# Patient Record
Sex: MALE | Race: WHITE | NOT HISPANIC OR LATINO | Employment: FULL TIME | URBAN - METROPOLITAN AREA
[De-identification: names, ages, dates, MRNs, and addresses within clinical notes are randomized per-mention and may not be internally consistent; named-entity substitution may affect disease eponyms.]

---

## 2018-06-13 ENCOUNTER — OFFICE VISIT (OUTPATIENT)
Dept: URGENT CARE | Facility: URGENT CARE | Age: 23
End: 2018-06-13
Payer: COMMERCIAL

## 2018-06-13 VITALS
DIASTOLIC BLOOD PRESSURE: 60 MMHG | WEIGHT: 159 LBS | TEMPERATURE: 98.8 F | SYSTOLIC BLOOD PRESSURE: 142 MMHG | HEART RATE: 88 BPM | BODY MASS INDEX: 21.54 KG/M2 | HEIGHT: 72 IN

## 2018-06-13 DIAGNOSIS — L02.32 FURUNCLE OF BUTTOCK: Primary | ICD-10-CM

## 2018-06-13 PROCEDURE — 99213 OFFICE O/P EST LOW 20 MIN: CPT | Performed by: INTERNAL MEDICINE

## 2018-06-13 RX ORDER — SULFAMETHOXAZOLE/TRIMETHOPRIM 800-160 MG
1 TABLET ORAL 2 TIMES DAILY
Qty: 20 TABLET | Refills: 0 | Status: SHIPPED | OUTPATIENT
Start: 2018-06-13 | End: 2019-12-11

## 2018-06-13 NOTE — NURSING NOTE
Ed Polanco;   Chief Complaint   Patient presents with     Rectal Problem     lump in rectal area onset yesterday, painful, growing in size      Urgent Care     Initial /60 (BP Location: Right arm, Patient Position: Chair, Cuff Size: Adult Regular)  Pulse 88  Temp 98.8  F (37.1  C) (Oral)  Ht 6' (1.829 m)  Wt 159 lb (72.1 kg)  BMI 21.56 kg/m2 Estimated body mass index is 21.56 kg/(m^2) as calculated from the following:    Height as of this encounter: 6' (1.829 m).    Weight as of this encounter: 159 lb (72.1 kg)..  BP completed using cuff size regular.  Kerrie Velez R.N.

## 2018-06-13 NOTE — PROGRESS NOTES
SUBJECTIVE:   Ed Polanco is a 22 year old male presenting with a chief complaint of   Chief Complaint   Patient presents with     Rectal Problem     lump in rectal area onset yesterday, painful, growing in size      Urgent Care       He is an established patient of [facility].      Size of blueberry yesterday and larger today  Area is deep under skin  No redness  Painful to sit.  firm    No treatment tried    No fever    First episode    Review of Systems    Past Medical History:   Diagnosis Date     Allergic rhinitis, cause unspecified     Allergic rhinitis     Asthma     childhood.  outgrew     Contact dermatitis and other eczema, due to unspecified cause      Family History   Problem Relation Age of Onset     Other Cancer Paternal Grandmother      brain     Other Cancer Paternal Grandfather      pancreatic     Current Outpatient Prescriptions   Medication Sig Dispense Refill     albuterol (ALBUTEROL) 108 (90 BASE) MCG/ACT inhaler 1-2 puffs q4-6 hours prn shortness of breath/ also 1/2 hour prior to exercise. 1 each 0     minocycline (MINOCIN,DYNACIN) 50 MG capsule Take 1 capsule (50 mg) by mouth 2 times daily (Patient not taking: Reported on 6/13/2018) 60 capsule 1     sulfamethoxazole-trimethoprim (BACTRIM DS/SEPTRA DS) 800-160 MG per tablet Take 1 tablet by mouth 2 times daily 20 tablet 0     Social History   Substance Use Topics     Smoking status: Former Smoker     Smokeless tobacco: Never Used      Comment: dad smokes outside not in the home     Alcohol use No       OBJECTIVE  /60 (BP Location: Right arm, Patient Position: Chair, Cuff Size: Adult Regular)  Pulse 88  Temp 98.8  F (37.1  C) (Oral)  Ht 6' (1.829 m)  Wt 159 lb (72.1 kg)  BMI 21.56 kg/m2    Physical Exam   Constitutional: He appears well-developed and well-nourished.   Skin:   Perineum left side  3 x 1 cm firm area.   Non-fluent.  Skin colored.  No redness or warmth at this time       Labs:  No results found for this or any  previous visit (from the past 24 hour(s)).        ASSESSMENT:      ICD-10-CM    1. Furuncle of buttock L02.32 sulfamethoxazole-trimethoprim (BACTRIM DS/SEPTRA DS) 800-160 MG per tablet        Medical Decision Making:    PLAN:      Patient Instructions     Bactrim 2 x day for 7-10 days.  Yogurt/probiotics  May alternate between sitz and cool compress.  If worsens, larger, develops head, - may need incision and drainage.  If drains on own, may clear up on own    Monitor size & fever    Call or return to clinic if symptoms worsen or fail to improve as anticipated.        Understanding Carbuncles    A carbuncle is a painful boil under the skin. It happens when a group of hair follicles become infected. Follicles are the tiny holes from which hair grows out of your skin.  How to say it  Pedro   What causes carbuncles?  A carbuncle is caused by an infection with the bacteria Staphylococcus aureus. They are common on areas of the body where friction and sweat occur. They usually appear on the back of the neck, back, and thighs. This type of infection can also happen when the skin is injured, such as by a cut or bug bite.  The bacteria that causes carbuncles can spread easily from person to person. People at higher risk for boils are those with diabetes or a weak immune system. Drug users who use needles are also more likely to get them.  Symptoms of carbuncles  A carbuncle starts as a small painful bump. But it can grow quickly. It may become:    Red    Swollen    Tender  Carbuncles may ooze pus. They may also cause fever and a general feeling of illness.  Treatment for carbuncles  A carbuncle may heal on its own in a few weeks. But the pus within it needs to come out first. Treatment options include:    Warm compress. Putting a warm, wet washcloth on the boil will help the pus drain out. You should never try to pop a boil. That can cause the infection to spread.    Surgical drainage. If the boil doesn t drain on  its own, your healthcare provider may need to cut into it.    Antibiotics. In some cases, oral antibiotics may be prescribed to fight the infection, especially if the carbuncle returns. You will likely have to take the medicine for 5 to 7 days. You may need to take it longer for a severe case.    Good hygiene. Proper handwashing can prevent boils from spreading and coming back. Also wash things that have been in contact with the carbuncle in hot water. This includes items such as clothing and towels.  Complications of carbuncles  The main complication of a carbuncle is the spreading of the infection. The bacteria can infect the heart and bone. It can also lead to septic shock, an infection of the entire body.  When to call your healthcare provider  Call your healthcare provider right away if you have any of these:    Fever of 100.4 F (38 C) or higher, or as directed    Redness, swelling, or fluid leaking from a carbuncle that gets worse    Pain that gets worse    Symptoms that don t get better, or get worse    New symptoms   Date Last Reviewed: 5/1/2016 2000-2017 [employer]. [address] [place] [address], [place] [address]. All rights reserved. This information is not intended as a substitute for professional medical care. Always follow your healthcare professional's instructions.

## 2018-06-13 NOTE — PATIENT INSTRUCTIONS
Bactrim 2 x day for 7-10 days.  Yogurt/probiotics  May alternate between sitz and cool compress.  If worsens, larger, develops head, - may need incision and drainage.  If drains on own, may clear up on own    Monitor size & fever    Call or return to clinic if symptoms worsen or fail to improve as anticipated.        Understanding Carbuncles    A carbuncle is a painful boil under the skin. It happens when a group of hair follicles become infected. Follicles are the tiny holes from which hair grows out of your skin.  How to say it  Pedro   What causes carbuncles?  A carbuncle is caused by an infection with the bacteria Staphylococcus aureus. They are common on areas of the body where friction and sweat occur. They usually appear on the back of the neck, back, and thighs. This type of infection can also happen when the skin is injured, such as by a cut or bug bite.  The bacteria that causes carbuncles can spread easily from person to person. People at higher risk for boils are those with diabetes or a weak immune system. Drug users who use needles are also more likely to get them.  Symptoms of carbuncles  A carbuncle starts as a small painful bump. But it can grow quickly. It may become:    Red    Swollen    Tender  Carbuncles may ooze pus. They may also cause fever and a general feeling of illness.  Treatment for carbuncles  A carbuncle may heal on its own in a few weeks. But the pus within it needs to come out first. Treatment options include:    Warm compress. Putting a warm, wet washcloth on the boil will help the pus drain out. You should never try to pop a boil. That can cause the infection to spread.    Surgical drainage. If the boil doesn t drain on its own, your healthcare provider may need to cut into it.    Antibiotics. In some cases, oral antibiotics may be prescribed to fight the infection, especially if the carbuncle returns. You will likely have to take the medicine for 5 to 7 days. You may need  to take it longer for a severe case.    Good hygiene. Proper handwashing can prevent boils from spreading and coming back. Also wash things that have been in contact with the carbuncle in hot water. This includes items such as clothing and towels.  Complications of carbuncles  The main complication of a carbuncle is the spreading of the infection. The bacteria can infect the heart and bone. It can also lead to septic shock, an infection of the entire body.  When to call your healthcare provider  Call your healthcare provider right away if you have any of these:    Fever of 100.4 F (38 C) or higher, or as directed    Redness, swelling, or fluid leaking from a carbuncle that gets worse    Pain that gets worse    Symptoms that don t get better, or get worse    New symptoms   Date Last Reviewed: 5/1/2016 2000-2017 [employer]. [address] [place] [address], [place] [address]. All rights reserved. This information is not intended as a substitute for professional medical care. Always follow your healthcare professional's instructions.

## 2018-06-13 NOTE — MR AVS SNAPSHOT
After Visit Summary   6/13/2018    Ed Polanco    MRN: [id number]           Patient Information     Date Of Birth          1995        Visit Information        Provider Department      6/13/2018 5:00 PM Zaida Ronquillo MD [facility]        Today's Diagnoses     Furuncle of buttock    -  1      Care Instructions    Bactrim 2 x day for 7-10 days.  Yogurt/probiotics  May alternate between sitz and cool compress.  If worsens, larger, develops head, - may need incision and drainage.  If drains on own, may clear up on own    Monitor size & fever    Call or return to clinic if symptoms worsen or fail to improve as anticipated.        Understanding Carbuncles    A carbuncle is a painful boil under the skin. It happens when a group of hair follicles become infected. Follicles are the tiny holes from which hair grows out of your skin.  How to say it  Pedro   What causes carbuncles?  A carbuncle is caused by an infection with the bacteria Staphylococcus aureus. They are common on areas of the body where friction and sweat occur. They usually appear on the back of the neck, back, and thighs. This type of infection can also happen when the skin is injured, such as by a cut or bug bite.  The bacteria that causes carbuncles can spread easily from person to person. People at higher risk for boils are those with diabetes or a weak immune system. Drug users who use needles are also more likely to get them.  Symptoms of carbuncles  A carbuncle starts as a small painful bump. But it can grow quickly. It may become:    Red    Swollen    Tender  Carbuncles may ooze pus. They may also cause fever and a general feeling of illness.  Treatment for carbuncles  A carbuncle may heal on its own in a few weeks. But the pus within it needs to come out first. Treatment options include:    Warm compress. Putting a warm, wet washcloth on the boil will help the pus drain out. You should  never try to pop a boil. That can cause the infection to spread.    Surgical drainage. If the boil doesn t drain on its own, your healthcare provider may need to cut into it.    Antibiotics. In some cases, oral antibiotics may be prescribed to fight the infection, especially if the carbuncle returns. You will likely have to take the medicine for 5 to 7 days. You may need to take it longer for a severe case.    Good hygiene. Proper handwashing can prevent boils from spreading and coming back. Also wash things that have been in contact with the carbuncle in hot water. This includes items such as clothing and towels.  Complications of carbuncles  The main complication of a carbuncle is the spreading of the infection. The bacteria can infect the heart and bone. It can also lead to septic shock, an infection of the entire body.  When to call your healthcare provider  Call your healthcare provider right away if you have any of these:    Fever of 100.4 F (38 C) or higher, or as directed    Redness, swelling, or fluid leaking from a carbuncle that gets worse    Pain that gets worse    Symptoms that don t get better, or get worse    New symptoms   Date Last Reviewed: 5/1/2016 2000-2017 [employer]. [address] [place] [address]. All rights reserved. This information is not intended as a substitute for professional medical care. Always follow your healthcare professional's instructions.                Follow-ups after your visit        Who to contact     If you have questions or need follow up information about today's clinic visit or your schedule please contact [facility] directly at [phone].  Normal or non-critical lab and imaging results will be communicated to you by MyChart, letter or phone within 4 business days after the clinic has received the results. If you do not hear from us within 7 days, please contact the clinic through MyChart or phone. If you have a  "critical or abnormal lab result, we will notify you by phone as soon as possible.  Submit refill requests through [employer] or call your pharmacy and they will forward the refill request to us. Please allow 3 business days for your refill to be completed.          Additional Information About Your Visit        [employer] Information     [employer] lets you send messages to your doctor, view your test results, renew your prescriptions, schedule appointments and more. To sign up, go to [email or web address] . Click on \"Log in\" on the left side of the screen, which will take you to the Welcome page. Then click on \"Sign up Now\" on the right side of the page.     You will be asked to enter the access code listed below, as well as some personal information. Please follow the directions to create your username and password.     Your access code is: [email or web address]  Expires: 2018  5:24 PM     Your access code will  in 90 days. If you need help or a new code, please call your [facility] clinic or [phone].        Care EveryWhere ID     This is your Care EveryWhere ID. This could be used by other organizations to access your [facility] medical records  [id number]        Your Vitals Were     Pulse Temperature Height BMI (Body Mass Index)          88 98.8  F (37.1  C) (Oral) 6' (1.829 m) 21.56 kg/m2         Blood Pressure from Last 3 Encounters:   18 142/60   06/02/15 112/66   12 112/70    Weight from Last 3 Encounters:   18 159 lb (72.1 kg)   06/02/15 169 lb 12.8 oz (77 kg) (71 %)*   12 150 lb 9.6 oz (68.3 kg) (65 %)*     * Growth percentiles are based on CDC 2-20 Years data.              Today, you had the following     No orders found for display         Today's Medication Changes          These changes are accurate as of 18  5:24 PM.  If you have any questions, ask your nurse or doctor.               Start taking these medicines.        Dose/Directions    sulfamethoxazole-trimethoprim " 800-160 MG per tablet   Commonly known as:  BACTRIM DS/SEPTRA DS   Used for:  Furuncle of buttock   Started by:  Zaida Ronquillo MD        Dose:  1 tablet   Take 1 tablet by mouth 2 times daily   Quantity:  20 tablet   Refills:  0            Where to get your medicines      These medications were sent to [facility] - [place], [place] - [address]  [address] [facility], [place] [address]     Phone:  [phone]     sulfamethoxazole-trimethoprim 800-160 MG per tablet                Primary Care Provider Office Phone # Fax #    Jeremiah Melgar [phone]       [address]  [facility] [address]        Equal Access to Services     KULDEEP VOSS : Hadii charmaine garcia hadasho Soomaali, waaxda luqadaha, qaybta kaalmada adeegyada, waxay emeli henry . So [facility] [phone].    ATENCIÓN: Si habla español, tiene a ruvalcaba disposición servicios gratuitos de asistencia lingüística. [facility] [phone].    We comply with applicable federal civil rights laws and [place] laws. We do not discriminate on the basis of race, color, national origin, age, disability, sex, sexual orientation, or gender identity.            Thank you!     Thank you for choosing [facility]  for your care. Our goal is always to provide you with excellent care. Hearing back from our patients is one way we can continue to improve our services. Please take a few minutes to complete the written survey that you may receive in the mail after your visit with us. Thank you!             Your Updated Medication List - Protect others around you: Learn how to safely use, store and throw away your medicines at www.disposemymeds.org.          This list is accurate as of 6/13/18  5:24 PM.  Always use your most recent med list.                   Brand Name Dispense Instructions for use Diagnosis    albuterol 108 (90 Base) MCG/ACT Inhaler    PROAIR HFA    1 each    1-2 puffs q4-6 hours prn  shortness of breath/ also 1/2 hour prior to exercise.    Acute bronchospasm       minocycline 50 MG capsule    MINOCIN/DYNACIN    60 capsule    Take 1 capsule (50 mg) by mouth 2 times daily    Cystic acne       sulfamethoxazole-trimethoprim 800-160 MG per tablet    BACTRIM DS/SEPTRA DS    20 tablet    Take 1 tablet by mouth 2 times daily    Furuncle of buttock

## 2019-12-11 ENCOUNTER — OFFICE VISIT (OUTPATIENT)
Dept: URGENT CARE | Facility: URGENT CARE | Age: 24
End: 2019-12-11
Payer: COMMERCIAL

## 2019-12-11 VITALS
RESPIRATION RATE: 14 BRPM | HEART RATE: 60 BPM | BODY MASS INDEX: 23.03 KG/M2 | SYSTOLIC BLOOD PRESSURE: 128 MMHG | HEIGHT: 72 IN | TEMPERATURE: 98.8 F | OXYGEN SATURATION: 100 % | DIASTOLIC BLOOD PRESSURE: 80 MMHG | WEIGHT: 170 LBS

## 2019-12-11 DIAGNOSIS — J06.9 VIRAL URI WITH COUGH: Primary | ICD-10-CM

## 2019-12-11 PROCEDURE — 99213 OFFICE O/P EST LOW 20 MIN: CPT | Performed by: NURSE PRACTITIONER

## 2019-12-11 ASSESSMENT — MIFFLIN-ST. JEOR: SCORE: 1799.11

## 2019-12-11 NOTE — PROGRESS NOTES
SUBJECTIVE:   Ed Polanco is a 24 year old male presenting with a chief complaint of   Chief Complaint   Patient presents with     URI     Urgent Care     bad coughing, going on since last weekend.    .    Onset of symptoms was 5 day(s) ago.  Course of illness is waxing and waning   Severity moderate  Current and Associated symptoms: sore throat  Treatment measures tried include None tried.  Predisposing factors include ill contact: Work.    CONSTITUTIONAL: no fatigue, no unexpected change in weight  SKIN: no worrisome rashes, no worrisome moles, no worrisome lesions  EYES: no acute vision problems or changes  ENT: no ear problems, no mouth problems, no throat problems  CV: no chest pain, no palpitations, no new or worsening peripheral edema  GI: no nausea, no vomiting, no constipation, no diarrhea  : no frequency, no dysuria, no hematuria  MS: no claudication, no myalgias, no joint aches  NEURO: no weakness, no dizziness, no syncope, no headaches  HEME: no easy bruising, no bleeding problems      Past Medical History:   Diagnosis Date     Allergic rhinitis, cause unspecified     Allergic rhinitis     Asthma     childhood.  outgrew     Contact dermatitis and other eczema, due to unspecified cause      Current Outpatient Medications   Medication Sig Dispense Refill     albuterol (ALBUTEROL) 108 (90 BASE) MCG/ACT inhaler 1-2 puffs q4-6 hours prn shortness of breath/ also 1/2 hour prior to exercise. 1 each 0     Social History     Tobacco Use     Smoking status: Former Smoker     Smokeless tobacco: Never Used     Tobacco comment: dad smokes outside not in the home   Substance Use Topics     Alcohol use: No       OBJECTIVE  /80   Pulse 60   Temp 98.8  F (37.1  C) (Oral)   Resp 14   Ht 1.829 m (6')   Wt 77.1 kg (170 lb)   SpO2 100%   BMI 23.06 kg/m        GENERAL APPEARANCE: healthy appearing, alert     EYES: PERRL, EOMI, sclera non-icteric     HENT: oral exam benign, mucus membranes slightly intact  but slightly dry without ulcers or lesions     NECK: no adenopathy or asymmetry, thyroid normal to palpation     RESP: lungs clear to auscultation - no rales, rhonchi or wheezes     CV: regular rates and rhythm, no murmurs, rubs, or gallop     ABDOMEN:  soft, nontender, no HSM or masses and bowel sounds normal     MS: extremities normal- no gross deformities noted; normal muscle tone.     SKIN: no suspicious lesions or rashes    X-Ray was not done.      ICD-10-CM    1. Viral URI with cough J06.9     B97.89        Followup:  Return in about 1 week (around 12/18/2019), or if symptoms worsen or fail to improve.    Patient Instructions   Upper respiratory infections are usually caused by viruses and, sometimes certain bacteria.  Antibiotics don't help the vast majority of people recover any quicker even when caused by a bacteria.  The body will fight this infection but it needs to be treated well in order to help heal itself.  Rest as needed.  It is ok to reduce food intake if appetite is poor but it is quite important to maintain/increase fluid intake.    For cough, dextromethorphan/guaifenesin combinations help loosen secretions and suppress cough safely without significant risk of sedation. Often 2 puffs four times daily of an albuterol inhaler will help with bronchitis.  This is a prescription medicine.    For nasal congestion and sinus pressure, pseudoephedrine (Sudafed) or phenylephrine is often helpful but it can cause elevations in blood pressure and insomnia.  Short courses of a nasal decongestant spray (Afrin or Neosinephrine) can be appropriate but their use should be restricted to 3 days due to the high risk of nasal addiction.    For pain and fevers, acetaminophen (Tylenol) is most appropriate.  Ibuprofen (Advil) or naproxen (Aleve) are useful too and last longer but they can cause elevation of blood pressure or stomach problems.    Antihistamines (Benadryl, Dimetapp, etc.) cause sedation, confusion, bowel  and urinary abnormalities and are of little use for infectious causes of cough and nasal congestion.  Their use should be reserved for allergic symptoms.      DAVION Arias, CNP  [facility] Provider

## 2019-12-23 ENCOUNTER — OFFICE VISIT (OUTPATIENT)
Dept: URGENT CARE | Facility: URGENT CARE | Age: 24
End: 2019-12-23
Payer: COMMERCIAL

## 2019-12-23 VITALS
DIASTOLIC BLOOD PRESSURE: 66 MMHG | HEIGHT: 72 IN | TEMPERATURE: 99.3 F | RESPIRATION RATE: 14 BRPM | SYSTOLIC BLOOD PRESSURE: 128 MMHG | WEIGHT: 170 LBS | HEART RATE: 87 BPM | BODY MASS INDEX: 23.03 KG/M2

## 2019-12-23 DIAGNOSIS — J40 SINOBRONCHITIS: Primary | ICD-10-CM

## 2019-12-23 DIAGNOSIS — J32.9 SINOBRONCHITIS: Primary | ICD-10-CM

## 2019-12-23 PROCEDURE — 99213 OFFICE O/P EST LOW 20 MIN: CPT | Performed by: FAMILY MEDICINE

## 2019-12-23 RX ORDER — AZITHROMYCIN 250 MG/1
TABLET, FILM COATED ORAL
Qty: 6 TABLET | Refills: 0 | Status: SHIPPED | OUTPATIENT
Start: 2019-12-23 | End: 2020-03-19

## 2019-12-23 ASSESSMENT — MIFFLIN-ST. JEOR: SCORE: 1799.11

## 2019-12-24 NOTE — PROGRESS NOTES
Subjective: See notes from December 11.  He kept powering through it, thinking it any day he would get better, coughing fits, congestion but suddenly this morning it took a turn for the worse with nasal passages really raw, opaque mucus both coughing up and blowing out of his nose, felt like fevers and chills.  There has been a long-lasting cough going through his office.    Objective: ENT is nonspecific.  Neck is normal.  Lungs are clear.  Heart is regular without murmurs.  Abdomen benign.    Assessment and plan: Pretty classic second sickening suggesting a bacterial sinobronchitis.  Will treat with antibiotics.

## 2020-03-13 ENCOUNTER — TRANSCRIBE ORDERS (OUTPATIENT)
Dept: OTHER | Age: 25
End: 2020-03-13

## 2020-03-13 DIAGNOSIS — H53.40 VISUAL FIELD DEFECT: Primary | ICD-10-CM

## 2020-03-16 ENCOUNTER — NURSE TRIAGE (OUTPATIENT)
Dept: NURSING | Facility: CLINIC | Age: 25
End: 2020-03-16

## 2020-03-16 ENCOUNTER — TELEPHONE (OUTPATIENT)
Dept: OPHTHALMOLOGY | Facility: CLINIC | Age: 25
End: 2020-03-16

## 2020-03-16 NOTE — TELEPHONE ENCOUNTER
Mother reporting, Pt has had decreased vision and lost vision in the right eye about a week ago.      Pt has an office visit 4/21/2020.     Mother very concerned regarding the right eye loss.     Mother requesting an earlier office visit for Pt care.    Mother mentioned this happen to her daughter and she was diagnosed with MS.   Mother's sister was Diagnosed with ALS.    So mother is very concerned about the sudden loss of vision in the right eye and would like an office visit sooner.    Pt is healthy at the present time.   No Cold or cough symptoms noted.      Pl call the Mother (Laura) @ [phone] for a sooner office visit for Pt care.    Thank you     Valerie Del Rosario RN  Central Triage Red Flags/Med Refills      Additional Information    Negative: Weakness of the face, arm or leg on one side of the body    Negative: Followed getting substance in the eye    Negative: Foreign body or object is or was lodged in the eye    Negative: Followed an eye injury    Negative: Followed sun lamp or sun exposure (UV keratitis)    Negative: Yellow or green discharge (pus) in the eye    Negative: Pregnant    Negative: Complete loss of vision in 1 or both eyes    Negative: Severe eye pain    Negative: Severe headache    Negative: Double vision    Negative: Blurred vision or visual changes and present now and sudden onset or new (e.g., minutes, hours, days) (Exception: seeing floaters / black specks OR previously diagnosed migraine headaches with same symptoms)    Negative: Patient sounds very sick or weak to the triager    Negative: Flashes of light  (Exception: brief from pressing on the eyeball)    Negative: Eye pain and brief (now gone) blurred vision or visual changes    Negative: Taking digoxin (e.g., Lanoxin, Digitek, Cardoxin, Lanoxicaps; Toloxin in [place]) and blurred vision, yellow vision, or yellow-green halos    Negative: Many floaters in the eye    Negative: Jaw pain while eating and age > 50    Negative: Headache  and age > 50    Patient wants to be seen    Protocols used: VISION LOSS OR CHANGE-A-OH

## 2020-03-16 NOTE — TELEPHONE ENCOUNTER
I spoke to the patient's mother, Laura, who notes sudden  Right eye vision loss without headache or pain.  She notes that she is the  as the patient can't receive calls at work.    She notes the patient's sister has MS and she is very concerned and would like a sooner patient.    She will have notes sent today from Optometrist.  I will send to Neuro ophthalmology team for review.

## 2020-03-17 NOTE — TELEPHONE ENCOUNTER
Spoke to patient and assisted with scheduling sooner appointment on 3/19 at 7:30 a.m.     Arabella Newman on 3/17/2020 at 11:48 AM

## 2020-03-19 ENCOUNTER — ALLIED HEALTH/NURSE VISIT (OUTPATIENT)
Dept: OPHTHALMOLOGY | Facility: CLINIC | Age: 25
End: 2020-03-19
Attending: OPHTHALMOLOGY
Payer: COMMERCIAL

## 2020-03-19 ENCOUNTER — OFFICE VISIT (OUTPATIENT)
Dept: OPHTHALMOLOGY | Facility: CLINIC | Age: 25
End: 2020-03-19
Attending: OPTOMETRIST
Payer: COMMERCIAL

## 2020-03-19 DIAGNOSIS — H46.9 OPTIC NEUROPATHY: Primary | ICD-10-CM

## 2020-03-19 DIAGNOSIS — H53.40 VISUAL FIELD DEFECT: ICD-10-CM

## 2020-03-19 DIAGNOSIS — H53.10 SUBJECTIVE VISUAL DISTURBANCE: Primary | ICD-10-CM

## 2020-03-19 PROCEDURE — 92083 EXTENDED VISUAL FIELD XM: CPT | Mod: ZF | Performed by: OPHTHALMOLOGY

## 2020-03-19 PROCEDURE — 92274 MULTIFOCAL ERG W/I&R: CPT | Mod: ZF

## 2020-03-19 PROCEDURE — 40000269 ZZH STATISTIC NO CHARGE FACILITY FEE: Mod: ZF

## 2020-03-19 PROCEDURE — 92133 CPTRZD OPH DX IMG PST SGM ON: CPT | Mod: ZF | Performed by: OPHTHALMOLOGY

## 2020-03-19 PROCEDURE — G0463 HOSPITAL OUTPT CLINIC VISIT: HCPCS | Mod: ZF

## 2020-03-19 ASSESSMENT — CONF VISUAL FIELD
OD_INFERIOR_NASAL_RESTRICTION: 2
OD_INFERIOR_TEMPORAL_RESTRICTION: 2
OS_NORMAL: 1

## 2020-03-19 ASSESSMENT — TONOMETRY
OD_IOP_MMHG: 16
IOP_METHOD: ICARE
OS_IOP_MMHG: 16

## 2020-03-19 ASSESSMENT — VISUAL ACUITY
METHOD: SNELLEN - LINEAR
OS_SC: 20/20
OD_SC: 20/20
OD_SC: 20/20
METHOD: SNELLEN - LINEAR
OS_SC: 20/20

## 2020-03-19 ASSESSMENT — SLIT LAMP EXAM - LIDS
COMMENTS: NORMAL
COMMENTS: NORMAL

## 2020-03-19 ASSESSMENT — EXTERNAL EXAM - LEFT EYE: OS_EXAM: NORMAL

## 2020-03-19 ASSESSMENT — CUP TO DISC RATIO
OD_RATIO: 0.1
OS_RATIO: 0.1

## 2020-03-19 ASSESSMENT — EXTERNAL EXAM - RIGHT EYE: OD_EXAM: NORMAL

## 2020-03-19 NOTE — NURSING NOTE
Chief Complaints and History of Present Illnesses   Patient presents with     Procedure     Chief Complaint(s) and History of Present Illness(es)     Procedure               Comments     Guy Carrero COA 11:45 AM March 19, 2020

## 2020-03-19 NOTE — NURSING NOTE
Chief Complaints and History of Present Illnesses   Patient presents with     Blurred Vision Evaluation     Chief Complaint(s) and History of Present Illness(es)     Blurred Vision Evaluation               Comments     Ed Polanco is a 24 year old male who presents today for  Visual field defect  Right eye inferiorly. Improves which searching. Onset 2-3 weeks ago. Slow onset. initially it was noticeable when entering a room with bright light (used to resolve in a few hour). Has been constant for the last 1.5 weeks.     Lore JOHNSTON 7:50 AM March 19, 2020
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
## 2020-03-19 NOTE — Clinical Note
mfERG(9 min 60Hz ) discussed and performed with Veris system and 0.5% CBM   MD interpretation to follow.
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
## 2020-03-20 ENCOUNTER — ANCILLARY PROCEDURE (OUTPATIENT)
Dept: MRI IMAGING | Facility: CLINIC | Age: 25
End: 2020-03-20
Attending: OPHTHALMOLOGY
Payer: COMMERCIAL

## 2020-03-20 DIAGNOSIS — H53.40 VISUAL FIELD DEFECT: ICD-10-CM

## 2020-03-20 DIAGNOSIS — H46.9 OPTIC NEUROPATHY: ICD-10-CM

## 2020-03-20 RX ORDER — GADOBUTROL 604.72 MG/ML
7.5 INJECTION INTRAVENOUS ONCE
Status: COMPLETED | OUTPATIENT
Start: 2020-03-20 | End: 2020-03-20

## 2020-03-20 RX ADMIN — GADOBUTROL 7.5 ML: 604.72 INJECTION INTRAVENOUS at 16:40

## 2020-03-20 NOTE — DISCHARGE INSTRUCTIONS
MRI Contrast Discharge Instructions    The IV contrast you received today will pass out of your body in your  urine. This will happen in the next 24 hours. You will not feel this process.  Your urine will not change color.    Drink at least 4 extra glasses of water or juice today (unless your doctor  has restricted your fluids). This reduces the stress on your kidneys.  You may take your regular medicines.    If you are on dialysis: It is best to have dialysis today.    If you have a reaction: Most reactions happen right away. If you have  any new symptoms after leaving the hospital (such as hives or swelling),  call your hospital at the correct number below. Or call your family doctor.  If you have breathing distress or wheezing, call 911.    Special instructions: ***    I have read and understand the above information.    Signature:______________________________________ Date:___________    Staff:__________________________________________ Date:___________     Time:__________    [facility] Departments:    ___[place]: [phone]  ___[facility]: [phone]  ___[place]: [phone] ___[facility]: [phone]  ___[facility]: [phone]  ___[facility]: [phone]  ___[phone]  ___[facility]: [phone]  ___[phone]

## 2020-03-23 ENCOUNTER — TELEPHONE (OUTPATIENT)
Dept: OPHTHALMOLOGY | Facility: CLINIC | Age: 25
End: 2020-03-23

## 2020-03-23 NOTE — TELEPHONE ENCOUNTER
Spoke with patient. Reviewed that his MRI showed mild inflammation of the R optic nerve, consistent with an optic neuritis.     Discussed that steroid treatment at this time would not be beneficial, but he should expect his vision to improve with time.     Will plan to see him back in clinic in 4-6 weeks, but discussed that he should call immediately if vision or visual field worsens.     Kalpesh Key MD  Ophthalmology Resident  PGY-3

## 2020-04-15 ENCOUNTER — TELEPHONE (OUTPATIENT)
Dept: OPHTHALMOLOGY | Facility: CLINIC | Age: 25
End: 2020-04-15

## 2020-04-15 NOTE — TELEPHONE ENCOUNTER
Left voicemail for patient as currently scheduled to see Dr. Mclean 4/28 at 2:30 pm. Dr. Mclean will no longer be here in the afternoon on that day and would like to reschedule to the morning or find a different day that works with patients scheduled.  Gave him my direct number in order to reschedule.      Arabella Newman on 4/15/2020 at 4:24 PM

## 2020-04-20 NOTE — TELEPHONE ENCOUNTER
Left another voicemail for patient asking if would be possible to reschedule to morning.  Will send [facility] [employer] message also.      Arabella Newman on 4/20/2020 at 8:39 AM

## 2021-01-14 ENCOUNTER — HEALTH MAINTENANCE LETTER (OUTPATIENT)
Age: 26
End: 2021-01-14

## 2021-10-24 ENCOUNTER — HEALTH MAINTENANCE LETTER (OUTPATIENT)
Age: 26
End: 2021-10-24

## 2022-02-13 ENCOUNTER — HEALTH MAINTENANCE LETTER (OUTPATIENT)
Age: 27
End: 2022-02-13

## 2022-10-15 ENCOUNTER — HEALTH MAINTENANCE LETTER (OUTPATIENT)
Age: 27
End: 2022-10-15

## 2023-03-26 ENCOUNTER — HEALTH MAINTENANCE LETTER (OUTPATIENT)
Age: 28
End: 2023-03-26

## 2024-03-16 ENCOUNTER — OFFICE VISIT (OUTPATIENT)
Dept: URGENT CARE | Facility: URGENT CARE | Age: 29
End: 2024-03-16
Payer: COMMERCIAL

## 2024-03-16 VITALS
DIASTOLIC BLOOD PRESSURE: 76 MMHG | TEMPERATURE: 97.9 F | HEART RATE: 94 BPM | RESPIRATION RATE: 15 BRPM | SYSTOLIC BLOOD PRESSURE: 114 MMHG | HEIGHT: 72 IN | BODY MASS INDEX: 26.01 KG/M2 | WEIGHT: 192 LBS | OXYGEN SATURATION: 99 %

## 2024-03-16 DIAGNOSIS — R27.8 DYSMETRIA: Primary | ICD-10-CM

## 2024-03-16 DIAGNOSIS — R20.8 DECREASED SENSATION: ICD-10-CM

## 2024-03-16 PROCEDURE — 99203 OFFICE O/P NEW LOW 30 MIN: CPT | Performed by: STUDENT IN AN ORGANIZED HEALTH CARE EDUCATION/TRAINING PROGRAM

## 2024-03-16 ASSESSMENT — VISUAL ACUITY: OU: 1

## 2024-03-16 NOTE — PROGRESS NOTES
Assessment & Plan     Dysmetria  Decreased sensation  - Adult Neurology  Referral    Ed is a 28-year-old male with a history of optic neuritis in 2019 presenting with progressive weakness of left upper and lower extremity over the last month. Neuro exam significant for decreased sensation of left upper arm, abnormal finger-to-nose and heel-to-shin testing on the left side. CN II-XII grossly in tact, his gait is normal and UE reflexes equal bilaterally. He has no systemic symptoms, no recent unexpected weight loss, and denies any recent travel or insect/animal exposures. Of note, his sister was diagnosed with MS around age 30 however Ed denies any current change in vision, cognitive impairment or urinary incontinence, which is reassuring against NPH. He is not on any new medications or exposed to new toxins and other than increasing gym workouts, has no new lifestyle/dietary changes. In the absence of infectious symptoms with subacute onset, I do not feel that he needs emergency workup at the ED but will need to see neurology soon for workup and likely imaging. Will place priority referral and ED precautions given, including any change in vision, loss of continence, or falls. Ed was understanding of the plan at the time of discharge.    No follow-ups on file.    Prabha Richards, DO  she/her  [facility]    Subjective     Ed Polanco is a 28 year old male who presents to clinic today for the following health issues:    HPI    Has been working out more frequently (has been working out since September but increased more in the last month. Started box steps, no change in diet) and feels like L leg and arm are weaker  Has been stumbling a lot, tripping over cats or feeling like he will fall when getting out of bed  Getting worse over the last two weeks  Works at the desk and feels like L hand types much slower and clumsier than normal. Trouble holding a water bottle in L hand  "as well as bringing water to mouth to drink  Feels like his speech is \"more mumbly\"  No urinary or bowel incontinence  No recent travel, rashes or insect bites, headaches  No red urine  No recent unexpected weight loss, night sweats  No change in vision (does not wear contacts or glasses), change in hearing    Had optic neuropathy 2020- chart reviewed, MRI. Resolved on it's own after 4-5mo  Sister has MS, diagnosed at age 30    Past Medical History:   Diagnosis Date    Allergic rhinitis, cause unspecified     Allergic rhinitis    Asthma     childhood.  outgrew    Contact dermatitis and other eczema, due to unspecified cause      Allergies   Allergen Reactions    No Known Drug Allergy      No current outpatient medications on file.     No current facility-administered medications for this visit.      Review of Systems  Constitutional, HEENT, cardiovascular, pulmonary, gi and gu systems are negative, except as otherwise noted.      Objective    /76   Pulse 94   Temp 97.9  F (36.6  C) (Oral)   Resp 15   Ht 1.822 m (5' 11.75\")   Wt 87.1 kg (192 lb)   SpO2 99%   BMI 26.22 kg/m    Physical Exam  Constitutional:       Appearance: Normal appearance.   HENT:      Head: Normocephalic.      Nose: Nose normal.      Mouth/Throat:      Mouth: Mucous membranes are moist.   Eyes:      General: Lids are normal. Vision grossly intact. Gaze aligned appropriately. No visual field deficit.     Extraocular Movements: Extraocular movements intact.      Right eye: No nystagmus.      Conjunctiva/sclera: Conjunctivae normal.      Pupils: Pupils are equal, round, and reactive to light.      Visual Fields: Right eye visual fields normal and left eye visual fields normal.   Musculoskeletal:      Right shoulder: Normal.      Left shoulder: Normal.      Right upper arm: Normal.      Left upper arm: Normal.      Right elbow: Normal.      Left elbow: Normal.      Right hand: Normal. Normal strength. Normal sensation. Normal pulse.      " Left hand: Normal strength. Normal sensation. Normal pulse.      Comments: UE myotomes equal bilaterally   Neurological:      Mental Status: He is alert and oriented to person, place, and time.      Cranial Nerves: Cranial nerves 2-12 are intact. No dysarthria or facial asymmetry.      Sensory: Sensory deficit (decreased sensation on dorsal and ventral left upper arm) present.      Motor: No tremor, atrophy or pronator drift.      Coordination: Finger-Nose-Finger Test abnormal (LUE) and Heel to Shin Test abnormal (LLE).      Gait: Gait is intact.      Deep Tendon Reflexes: Reflexes are normal and symmetric.             The use of Dragon/Mafengwoation services may have been used to construct the content in this note; any grammatical or spelling errors are non-intentional. Please contact the author of this note directly if you are in need of any clarification.

## 2024-03-16 NOTE — PATIENT INSTRUCTIONS
Please go to the ED if you have any vision changes, falls from your stumbling, or loss of bladder or bowel control

## 2024-04-04 ENCOUNTER — OFFICE VISIT (OUTPATIENT)
Dept: NEUROLOGY | Facility: CLINIC | Age: 29
End: 2024-04-04
Attending: STUDENT IN AN ORGANIZED HEALTH CARE EDUCATION/TRAINING PROGRAM
Payer: COMMERCIAL

## 2024-04-04 VITALS
BODY MASS INDEX: 26.09 KG/M2 | HEART RATE: 79 BPM | DIASTOLIC BLOOD PRESSURE: 81 MMHG | WEIGHT: 191 LBS | SYSTOLIC BLOOD PRESSURE: 123 MMHG

## 2024-04-04 DIAGNOSIS — Z82.0 FAMILY HISTORY OF MS (MULTIPLE SCLEROSIS): ICD-10-CM

## 2024-04-04 DIAGNOSIS — Z86.69 HISTORY OF OPTIC NEURITIS: ICD-10-CM

## 2024-04-04 DIAGNOSIS — R27.8 DYSMETRIA: ICD-10-CM

## 2024-04-04 DIAGNOSIS — R53.1 LEFT-SIDED WEAKNESS: ICD-10-CM

## 2024-04-04 DIAGNOSIS — R29.818 TRANSIENT NEUROLOGICAL SYMPTOMS: Primary | ICD-10-CM

## 2024-04-04 PROCEDURE — 99204 OFFICE O/P NEW MOD 45 MIN: CPT | Performed by: PSYCHIATRY & NEUROLOGY

## 2024-04-04 NOTE — NURSING NOTE
"Ed Polanco is a 28 year old male who presents for:  Chief Complaint   Patient presents with    Neurologic Problem     Urgent Care symptoms   Optic neuritis in 2019  Sister is diagnosis with MS. Wants to check if patient has it as well.         Initial Vitals:  /81   Pulse 79   Wt 86.6 kg (191 lb)   BMI 26.09 kg/m   Estimated body mass index is 26.09 kg/m  as calculated from the following:    Height as of 3/16/24: 1.822 m (5' 11.75\").    Weight as of this encounter: 86.6 kg (191 lb).. Body surface area is 2.09 meters squared. BP completed using cuff size: wrist cuff    Juanjo Ferguson  "

## 2024-04-04 NOTE — LETTER
4/4/2024         RE: Ed Polanco  [address] [place] [address]        Dear Colleague,    Thank you for referring your patient, Ed Polanco, to the [facility] [place]. Please see a copy of my visit note below.    INITIAL NEUROLOGY CONSULTATION    DATE OF VISIT: 4/4/2024  MRN: [id number]  PATIENT NAME: Ed Polanco  YOB: 1995    REFERRING PROVIDER: Prabha Richards    Chief Complaint   Patient presents with     Neurologic Problem     Urgent Care symptoms   Optic neuritis in 2019  Sister is diagnosis with MS. Wants to check if patient has it as well.        SUBJECTIVE:                                                      HPI:   Ed Polanco is a 28 year old male whom I have been asked by Dr. Richards to see in consultation for concerns regarding dysmetria.  Per chart review, the patient was in urgent care a few weeks ago for progressive weakness of the left side over the past month.  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  Additionally, his sister has MS. He had a brain and orbit MRI completed in 2020 which did raise the concern for demyelinating disease.  Unclear if any additional workup was completed.    Ed says that he noticed some problems with coordination on the left side, starting in late February.  He noticed it with working out initially and had trouble bringing his water bottle to his mouth and then noticed that he could not hop on the left foot, but things were normal on the right.  Symptoms seem to worsen over the following weeks and he thinks that it peaked two days after he was in urgent Care, and then started to dissipate and he feels back to normal as of a week ago.  I asked whether they are was any numbness/sensory changes and he thinks that may be some subtle changes occurred with his other symptoms.      Ed tells me that he has not had any neurologic symptoms  between the issue with his eye (not sure which one) and now.  No additional workup was completed, other than the MRI from 2020.  He does not have any health problems, he is not on any medications.    No other family history of autoimmune disease.    Past Medical History:   Diagnosis Date     Allergic rhinitis, cause unspecified     Allergic rhinitis     Asthma     childhood.  outgrew     Contact dermatitis and other eczema, due to unspecified cause      Past Surgical History:   Procedure Laterality Date     PE TUBES         No current outpatient medications on file.     No current facility-administered medications for this visit.     Allergies   Allergen Reactions     No Known Drug Allergy         Problem (# of Occurrences) Relation (Name,Age of Onset)    Other Cancer (2) Paternal Grandmother: brain, Paternal Grandfather: pancreatic          Social History     Tobacco Use     Smoking status: Former     Smokeless tobacco: Never     Tobacco comments:     dad smokes outside not in the home   Vaping Use     Vaping Use: Every day   Substance Use Topics     Alcohol use: Yes     Comment: Once or twice a week     Drug use: No       REVIEW OF SYSTEMS:                                                      10-point review of systems is negative except as mentioned above in HPI.     EXAM:                                                      Physical Exam:   Vitals: /81   Pulse 79   Wt 86.6 kg (191 lb)   BMI 26.09 kg/m    BMI= Body mass index is 26.09 kg/m .  GENERAL: NAD.  HEENT: NC/AT.   CV: RRR. S1, S2.   NECK: No bruits.  PULM: Non-labored breathing.   Neurologic:  MENTAL STATUS: Alert, attentive. Speech is fluent. Normal comprehension. Normal concentration. Adequate fund of knowledge.   CRANIAL NERVES: Discs technically difficult to visualize. Visual fields intact to confrontation. Pupils equally, round and reactive to light. Facial sensation and movement normal. EOM full. Hearing intact to conversation. Trapezius  strength intact. Palate moves symmetrically. Tongue midline.  MOTOR: 5/5 in proximal and distal muscle groups of upper and lower extremities. Tone and bulk normal.   DTRs: Intact and symmetric in biceps, BR, patellae.  Babinski down-going bilaterally.   SENSATION: Normal light touch and pinprick. Intact proprioception at great toes. Vibration: Normal at both ankles.   COORDINATION: Normal finger nose finger. Knee heel shin normal.  STATION AND GAIT: Romberg Negative.  Able to stand on each foot individually without difficulty.  Casual gait is normal.  Right hand-dominant.    Relevant Data:  MRI Brain (3.20.2020):  Impression:  Equivocal asymmetric T2 hyperintense signal and enhancement in the  canalicular component of the right optic nerve. Correlated with  patient's history, this raises possibility of optic neuropathy.  Accompanying a few T2 hyperintense nonenhancing punctate foci in the  right cerebral hemisphere which may represent underlying demyelinating  process such as multiple sclerosis. Clinical correlation is  recommended.    Imaging reviewed independently by me.  Agree with radiology read.      ASSESSMENT and PLAN:                                                      Assessment:     ICD-10-CM    1. Transient neurological symptoms  R29.818       2. Dysmetria  R27.8 Adult Neurology  Referral     MR Brain and Orbits w/o & w Contrast     MR Cervical Spine w/o & w Contrast      3. Left-sided weakness  R53.1 MR Brain and Orbits w/o & w Contrast     MR Cervical Spine w/o & w Contrast      4. History of optic neuritis  Z86.69 MR Brain and Orbits w/o & w Contrast      5. Family history of MS (multiple sclerosis)  Z82.0 MR Brain and Orbits w/o & w Contrast     MR Cervical Spine w/o & w Contrast          Mr. Polanco is a pleasant healthy 28-year-old man with history of right optic neuropathy and recent transient left-sided weakness/discoordination.  Previous imaging was concerning for possible  demyelinating disease and he does have family history of MS.  Symptoms have all resolved but I do think it would be worthwhile to do an updated brain MRI and check his cervical spine as well.  If there has been a change (additional lesions), we may need to consider additional workup and possible treatment for MS.  If the imaging appears stable and there are no findings in his cervical spine, we will monitor.  I will let the patient know the plan once we have the MRI results back.  Ed expressed understanding and agreement with the plan.    Plan:  -- MRI Brain and Cervical spine.  We will notify you of the results and let you know if any additional workup is recommended.  -- Monitor for additional symptoms, concerns.  -- We will decide about follow-up based on what the imaging shows.    Total Time: 45 minutes were spent with the patient and in chart review/documentation (as described above in Assessment and Plan) /coordinating the care on date of service.    Nirali Lawrence MD  Neurology    CC: Jeremiah Melgar DO    Dragon software used in the dictation of this note.      Again, thank you for allowing me to participate in the care of your patient.        Sincerely,        Nirali Lawrence MD

## 2024-04-04 NOTE — PATIENT INSTRUCTIONS
Plan:  -- MRI Brain and Cervical spine.  We will notify you of the results and let you know if any additional workup is recommended.  -- Monitor for additional symptoms, concerns.  -- We will decide about follow-up based on what the imaging shows.

## 2024-04-04 NOTE — PROGRESS NOTES
INITIAL NEUROLOGY CONSULTATION    DATE OF VISIT: 4/4/2024  MRN: [id number]  PATIENT NAME: Ed Polanco  YOB: 1995    REFERRING PROVIDER: Prabha Richards    Chief Complaint   Patient presents with    Neurologic Problem     Urgent Care symptoms   Optic neuritis in 2019  Sister is diagnosis with MS. Wants to check if patient has it as well.        SUBJECTIVE:                                                      HPI:   Ed Polanco is a 28 year old male whom I have been asked by Dr. Richards to see in consultation for concerns regarding dysmetria.  Per chart review, the patient was in urgent care a few weeks ago for progressive weakness of the left side over the past month.  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  Additionally, his sister has MS. He had a brain and orbit MRI completed in 2020 which did raise the concern for demyelinating disease.  Unclear if any additional workup was completed.    Ed says that he noticed some problems with coordination on the left side, starting in late February.  He noticed it with working out initially and had trouble bringing his water bottle to his mouth and then noticed that he could not hop on the left foot, but things were normal on the right.  Symptoms seem to worsen over the following weeks and he thinks that it peaked two days after he was in urgent Care, and then started to dissipate and he feels back to normal as of a week ago.  I asked whether they are was any numbness/sensory changes and he thinks that may be some subtle changes occurred with his other symptoms.      Ed tells me that he has not had any neurologic symptoms between the issue with his eye (not sure which one) and now.  No additional workup was completed, other than the MRI from 2020.  He does not have any health problems, he is not on any medications.    No other family history of autoimmune disease.    Past Medical History:    Diagnosis Date    Allergic rhinitis, cause unspecified     Allergic rhinitis    Asthma     childhood.  outgrew    Contact dermatitis and other eczema, due to unspecified cause      Past Surgical History:   Procedure Laterality Date    PE TUBES         No current outpatient medications on file.     No current facility-administered medications for this visit.     Allergies   Allergen Reactions    No Known Drug Allergy         Problem (# of Occurrences) Relation (Name,Age of Onset)    Other Cancer (2) Paternal Grandmother: brain, Paternal Grandfather: pancreatic          Social History     Tobacco Use    Smoking status: Former    Smokeless tobacco: Never    Tobacco comments:     dad smokes outside not in the home   Vaping Use    Vaping Use: Every day   Substance Use Topics    Alcohol use: Yes     Comment: Once or twice a week    Drug use: No       REVIEW OF SYSTEMS:                                                      10-point review of systems is negative except as mentioned above in HPI.     EXAM:                                                      Physical Exam:   Vitals: /81   Pulse 79   Wt 86.6 kg (191 lb)   BMI 26.09 kg/m    BMI= Body mass index is 26.09 kg/m .  GENERAL: NAD.  HEENT: NC/AT.   CV: RRR. S1, S2.   NECK: No bruits.  PULM: Non-labored breathing.   Neurologic:  MENTAL STATUS: Alert, attentive. Speech is fluent. Normal comprehension. Normal concentration. Adequate fund of knowledge.   CRANIAL NERVES: Discs technically difficult to visualize. Visual fields intact to confrontation. Pupils equally, round and reactive to light. Facial sensation and movement normal. EOM full. Hearing intact to conversation. Trapezius strength intact. Palate moves symmetrically. Tongue midline.  MOTOR: 5/5 in proximal and distal muscle groups of upper and lower extremities. Tone and bulk normal.   DTRs: Intact and symmetric in biceps, BR, patellae.  Babinski down-going bilaterally.   SENSATION: Normal light touch  and pinprick. Intact proprioception at great toes. Vibration: Normal at both ankles.   COORDINATION: Normal finger nose finger. Knee heel shin normal.  STATION AND GAIT: Romberg Negative.  Able to stand on each foot individually without difficulty.  Casual gait is normal.  Right hand-dominant.    Relevant Data:  MRI Brain (3.20.2020):  Impression:  Equivocal asymmetric T2 hyperintense signal and enhancement in the  canalicular component of the right optic nerve. Correlated with  patient's history, this raises possibility of optic neuropathy.  Accompanying a few T2 hyperintense nonenhancing punctate foci in the  right cerebral hemisphere which may represent underlying demyelinating  process such as multiple sclerosis. Clinical correlation is  recommended.    Imaging reviewed independently by me.  Agree with radiology read.      ASSESSMENT and PLAN:                                                      Assessment:     ICD-10-CM    1. Transient neurological symptoms  R29.818       2. Dysmetria  R27.8 Adult Neurology  Referral     MR Brain and Orbits w/o & w Contrast     MR Cervical Spine w/o & w Contrast      3. Left-sided weakness  R53.1 MR Brain and Orbits w/o & w Contrast     MR Cervical Spine w/o & w Contrast      4. History of optic neuritis  Z86.69 MR Brain and Orbits w/o & w Contrast      5. Family history of MS (multiple sclerosis)  Z82.0 MR Brain and Orbits w/o & w Contrast     MR Cervical Spine w/o & w Contrast          Mr. Polanco is a pleasant healthy 28-year-old man with history of right optic neuropathy and recent transient left-sided weakness/discoordination.  Previous imaging was concerning for possible demyelinating disease and he does have family history of MS.  Symptoms have all resolved but I do think it would be worthwhile to do an updated brain MRI and check his cervical spine as well.  If there has been a change (additional lesions), we may need to consider additional workup and possible  treatment for MS.  If the imaging appears stable and there are no findings in his cervical spine, we will monitor.  I will let the patient know the plan once we have the MRI results back.  Ed expressed understanding and agreement with the plan.    Plan:  -- MRI Brain and Cervical spine.  We will notify you of the results and let you know if any additional workup is recommended.  -- Monitor for additional symptoms, concerns.  -- We will decide about follow-up based on what the imaging shows.    Total Time: 45 minutes were spent with the patient and in chart review/documentation (as described above in Assessment and Plan) /coordinating the care on date of service.    Nirali Lawrence MD  Neurology    CC: DO Case [employer] software used in the dictation of this note.

## 2024-05-26 ENCOUNTER — HEALTH MAINTENANCE LETTER (OUTPATIENT)
Age: 29
End: 2024-05-26

## 2025-06-14 ENCOUNTER — HEALTH MAINTENANCE LETTER (OUTPATIENT)
Age: 30
End: 2025-06-14